# Patient Record
Sex: FEMALE | Race: WHITE | URBAN - METROPOLITAN AREA
[De-identification: names, ages, dates, MRNs, and addresses within clinical notes are randomized per-mention and may not be internally consistent; named-entity substitution may affect disease eponyms.]

---

## 2021-03-17 ENCOUNTER — OFFICE VISIT (OUTPATIENT)
Dept: URBAN - METROPOLITAN AREA CLINIC 96 | Facility: CLINIC | Age: 46
End: 2021-03-17

## 2021-04-28 ENCOUNTER — WEB ENCOUNTER (OUTPATIENT)
Dept: URBAN - METROPOLITAN AREA CLINIC 96 | Facility: CLINIC | Age: 46
End: 2021-04-28

## 2021-04-28 ENCOUNTER — OFFICE VISIT (OUTPATIENT)
Dept: URBAN - METROPOLITAN AREA CLINIC 96 | Facility: CLINIC | Age: 46
End: 2021-04-28
Payer: COMMERCIAL

## 2021-04-28 VITALS
TEMPERATURE: 99.1 F | HEIGHT: 67 IN | HEART RATE: 95 BPM | SYSTOLIC BLOOD PRESSURE: 113 MMHG | BODY MASS INDEX: 29.19 KG/M2 | WEIGHT: 186 LBS | DIASTOLIC BLOOD PRESSURE: 86 MMHG

## 2021-04-28 DIAGNOSIS — Z12.11 SCREENING FOR COLON CANCER: ICD-10-CM

## 2021-04-28 DIAGNOSIS — R10.11 RUQ PAIN: ICD-10-CM

## 2021-04-28 DIAGNOSIS — R14.0 BLOATING: ICD-10-CM

## 2021-04-28 DIAGNOSIS — R10.13 DYSPEPSIA: ICD-10-CM

## 2021-04-28 PROCEDURE — 99204 OFFICE O/P NEW MOD 45 MIN: CPT | Performed by: INTERNAL MEDICINE

## 2021-04-28 RX ORDER — SODIUM, POTASSIUM,MAG SULFATES 17.5-3.13G
354ML SOLUTION, RECONSTITUTED, ORAL ORAL
Qty: 354 MILLILITER | Refills: 0 | OUTPATIENT
Start: 2021-04-28 | End: 2021-04-29

## 2021-04-28 RX ORDER — LEVOTHYROXINE SODIUM 50 UG/1
1 TABLET IN THE MORNING ON AN EMPTY STOMACH TABLET ORAL ONCE A DAY
Status: ACTIVE | COMMUNITY

## 2021-04-28 NOTE — PHYSICAL EXAM GASTROINTESTINAL
Abdomen , soft, RUQ tender, nondistended , no guarding or rigidity , no masses palpable , normal bowel sounds , Liver and Spleen , no hepatomegaly present , no hepatosplenomegaly , liver nontender , spleen not palpable

## 2021-04-28 NOTE — HPI-OTHER HISTORIES
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 Took Miralax which has helped. No rectal bleeding, no melena. Will have bloating after dinnner. Has gained weight with COVID. No dysphagia, no odynophagia, no NSAIDs, RUQ pain one month ago typically worse after eating. No food allergies or diet restrictions.   No hx of PUD, no prior EGD or colonoscopy. No prior H pylori infection.   Maternal uncle with colon cancer in his 20's.   UTD with primary MD. Hx of fibroids. Recent ultrasound stable. Thryoid levels stable.  No hx of anesthesia problems. No chronic alcohol. Hx of back pain and takes Advil 5/weekly for years.  Denies any early satiety, does admit to bloating better with BM. No family hx of celiac, not on GFD.

## 2021-05-11 ENCOUNTER — LAB OUTSIDE AN ENCOUNTER (OUTPATIENT)
Dept: URBAN - METROPOLITAN AREA CLINIC 96 | Facility: CLINIC | Age: 46
End: 2021-05-11

## 2021-08-27 ENCOUNTER — OFFICE VISIT (OUTPATIENT)
Dept: URBAN - METROPOLITAN AREA SURGERY CENTER 18 | Facility: SURGERY CENTER | Age: 46
End: 2021-08-27
Payer: COMMERCIAL

## 2021-08-27 DIAGNOSIS — K31.89 ACQUIRED DEFORMITY OF DUODENUM: ICD-10-CM

## 2021-08-27 DIAGNOSIS — K29.30 CHRONIC SUPERFICIAL GASTRITIS: ICD-10-CM

## 2021-08-27 PROCEDURE — G0121 COLON CA SCRN NOT HI RSK IND: HCPCS | Performed by: INTERNAL MEDICINE

## 2021-08-27 PROCEDURE — G8907 PT DOC NO EVENTS ON DISCHARG: HCPCS | Performed by: INTERNAL MEDICINE

## 2021-08-27 PROCEDURE — 43239 EGD BIOPSY SINGLE/MULTIPLE: CPT | Performed by: INTERNAL MEDICINE

## 2021-10-27 ENCOUNTER — OFFICE VISIT (OUTPATIENT)
Dept: URBAN - METROPOLITAN AREA CLINIC 96 | Facility: CLINIC | Age: 46
End: 2021-10-27

## 2021-11-03 ENCOUNTER — OFFICE VISIT (OUTPATIENT)
Dept: URBAN - METROPOLITAN AREA CLINIC 96 | Facility: CLINIC | Age: 46
End: 2021-11-03
Payer: COMMERCIAL

## 2021-11-03 ENCOUNTER — DASHBOARD ENCOUNTERS (OUTPATIENT)
Age: 46
End: 2021-11-03

## 2021-11-03 VITALS
HEIGHT: 67 IN | WEIGHT: 189 LBS | DIASTOLIC BLOOD PRESSURE: 81 MMHG | SYSTOLIC BLOOD PRESSURE: 121 MMHG | TEMPERATURE: 98.4 F | HEART RATE: 75 BPM | BODY MASS INDEX: 29.66 KG/M2

## 2021-11-03 DIAGNOSIS — R14.0 BLOATING: ICD-10-CM

## 2021-11-03 DIAGNOSIS — R10.11 RUQ PAIN: ICD-10-CM

## 2021-11-03 DIAGNOSIS — R10.13 DYSPEPSIA: ICD-10-CM

## 2021-11-03 PROCEDURE — 99214 OFFICE O/P EST MOD 30 MIN: CPT | Performed by: INTERNAL MEDICINE

## 2021-11-03 RX ORDER — LEVOTHYROXINE SODIUM 50 UG/1
1 TABLET IN THE MORNING ON AN EMPTY STOMACH TABLET ORAL ONCE A DAY
Status: ACTIVE | COMMUNITY

## 2021-11-03 NOTE — HPI-OTHER HISTORIES
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 Took Miralax which has helped. No rectal bleeding, no melena. Will have bloating after dinnner. Has gained weight with COVID. No dysphagia, no odynophagia, no NSAIDs, RUQ pain one month ago typically worse after eating. No food allergies or diet restrictions.   No hx of PUD, no prior EGD or colonoscopy. No prior H pylori infection.   Maternal uncle with colon cancer in his 20's.   UTD with primary MD. Hx of fibroids. Recent ultrasound stable. Thryoid levels stable.  No hx of anesthesia problems. No chronic alcohol. Hx of back pain and takes Advil 5/weekly for years.  Denies any early satiety, does admit to bloating better with BM. No family hx of celiac, not on GFD.

## 2021-11-03 NOTE — HPI-TODAY'S VISIT:
EGD and colonoscopy performed 8/27/2021.  EGD with minimal gastric erythema, regular Z-line at 35 cm.  Colonoscopy performed same date with internal hemorrhoids, otherwise unremarkable exam to the cecum.  Pathology with duodenal biopsies demonstrating patchy increased intraepithelial lymphocytes and slight villous blunting.  Findings are nonspecific and may be due to medication effect, reactive duodenal apathy, nongluten protein sensitivity, small bacterial overgrowth, celiac disease.  Gastric biopsies negative for Helicobacter pylori.  Patient was advised to follow-up in clinic 2 to 3 weeks for follow-up testing to ensure no underlying celiac disease.  Patient is just now following up in clinic.  Right upper quadrant ultrasound performed 5/11/2021 demonstrated no significant sonographic abnormality.  Gallbladder empty with no wall thickening or pericholecystic fluid.

## 2021-11-05 LAB
ENDOMYSIAL ANTIBODY IGA: NEGATIVE
IMMUNOGLOBULIN A, QN, SERUM: 210
T-TRANSGLUTAMINASE (TTG) IGA: 3